# Patient Record
Sex: FEMALE | Race: WHITE | NOT HISPANIC OR LATINO | Employment: FULL TIME | URBAN - METROPOLITAN AREA
[De-identification: names, ages, dates, MRNs, and addresses within clinical notes are randomized per-mention and may not be internally consistent; named-entity substitution may affect disease eponyms.]

---

## 2017-12-21 ENCOUNTER — HOSPITAL ENCOUNTER (EMERGENCY)
Facility: HOSPITAL | Age: 62
Discharge: HOME | End: 2017-12-21
Attending: EMERGENCY MEDICINE
Payer: COMMERCIAL

## 2017-12-21 VITALS
WEIGHT: 145 LBS | HEART RATE: 67 BPM | HEIGHT: 70 IN | DIASTOLIC BLOOD PRESSURE: 78 MMHG | OXYGEN SATURATION: 99 % | SYSTOLIC BLOOD PRESSURE: 138 MMHG | RESPIRATION RATE: 16 BRPM | BODY MASS INDEX: 20.76 KG/M2

## 2017-12-21 DIAGNOSIS — T18.108A ESOPHAGEAL FOREIGN BODY: Primary | ICD-10-CM

## 2017-12-21 LAB
ANION GAP BLD CALC-SCNC: 15 MMOL/L (ref 4–13)
BUN BLD-MCNC: 14 MG/DL (ref 5–25)
CA-I BLD-SCNC: 1.25 MMOL/L (ref 1.12–1.32)
CHLORIDE BLD-SCNC: 104 MMOL/L (ref 100–108)
CREAT BLD-MCNC: 0.8 MG/DL (ref 0.6–1.3)
GFR SERPL CREATININE-BSD FRML MDRD: 80 ML/MIN/1.73SQ M
GLUCOSE SERPL-MCNC: 104 MG/DL (ref 65–140)
HCT VFR BLD CALC: 39 % (ref 34.8–46.1)
HGB BLDA-MCNC: 13.3 G/DL (ref 11.5–15.4)
PCO2 BLD: 25 MMOL/L (ref 21–32)
POTASSIUM BLD-SCNC: 3.5 MMOL/L (ref 3.5–5.3)
SODIUM BLD-SCNC: 140 MMOL/L (ref 136–145)
SPECIMEN SOURCE: ABNORMAL

## 2017-12-21 PROCEDURE — 85014 HEMATOCRIT: CPT

## 2017-12-21 PROCEDURE — 99283 EMERGENCY DEPT VISIT LOW MDM: CPT

## 2017-12-21 PROCEDURE — 96374 THER/PROPH/DIAG INJ IV PUSH: CPT

## 2017-12-21 PROCEDURE — 80047 BASIC METABLC PNL IONIZED CA: CPT

## 2017-12-21 RX ADMIN — GLUCAGON HYDROCHLORIDE 1 MG: KIT at 21:57

## 2017-12-22 NOTE — ED PROVIDER NOTES
History  Chief Complaint   Patient presents with    Foreign Body in Throat     PT to ED with apple chunk in throat, was eating fast earlier today and feels it was stuck in throat  Vomiting x 1 earlier, tried to swallow a bite of bread and had to throw up, able to swallow water  This is a 63year old female who presents with a foreign body food sensation in her throat after eating and apple at about 3:30 this afternoon  No prior similar episodes  She was able to drink a cup of coffee  Did throw up a small piece after trying to eat bread  History provided by:  Patient and spouse  Foreign Body in Throat   Location:  Swallowed  Suspected object:  Food  Pain quality:  Aching  Pain severity:  Moderate  Duration:  6 hours  Timing:  Constant  Progression:  Unchanged  Chronicity:  New  Worsened by:  Eating  Ineffective treatments:  Drinking  Associated symptoms: trouble swallowing    Associated symptoms: no abdominal pain and no vomiting    Risk factors: no prior similar events        None       History reviewed  No pertinent past medical history  Past Surgical History:   Procedure Laterality Date    VEIN LIGATION Right        History reviewed  No pertinent family history  I have reviewed and agree with the history as documented  Social History   Substance Use Topics    Smoking status: Current Every Day Smoker    Smokeless tobacco: Never Used    Alcohol use No        Review of Systems   HENT: Positive for trouble swallowing  Gastrointestinal: Negative for abdominal pain and vomiting  All other systems reviewed and are negative        Physical Exam  ED Triage Vitals [12/21/17 2114]   Temp Pulse Respirations Blood Pressure SpO2   -- 67 16 138/78 99 %      Temp src Heart Rate Source Patient Position - Orthostatic VS BP Location FiO2 (%)   -- Monitor Sitting Right arm --      Pain Score       --           Orthostatic Vital Signs  Vitals:    12/21/17 2114   BP: 138/78   Pulse: 67   Patient Position - Orthostatic VS: Sitting       Physical Exam   Constitutional: She is oriented to person, place, and time  She appears well-developed and well-nourished  No distress  HENT:   Head: Normocephalic and atraumatic  Right Ear: External ear normal    Left Ear: External ear normal    Nose: Nose normal    Mouth/Throat: Oropharynx is clear and moist    Eyes: EOM are normal  Pupils are equal, round, and reactive to light  No scleral icterus  Neck: Normal range of motion  Neck supple  No tracheal deviation present  Cardiovascular: Normal rate and intact distal pulses  Exam reveals no gallop and no friction rub  No murmur heard  Pulmonary/Chest: Effort normal and breath sounds normal  No stridor  No respiratory distress  She has no wheezes  She has no rales  Abdominal: Soft  Bowel sounds are normal  She exhibits no distension  There is no tenderness  There is no guarding  Musculoskeletal: Normal range of motion  She exhibits no edema, tenderness or deformity  Neurological: She is alert and oriented to person, place, and time  No cranial nerve deficit  Skin: Skin is warm and dry  No rash noted  She is not diaphoretic  Psychiatric: She has a normal mood and affect  Her behavior is normal  Thought content normal    Nursing note and vitals reviewed        ED Medications  Medications   glucagon (GLUCAGEN) injection 1 mg (1 mg Intravenous Given 12/21/17 2157)       Diagnostic Studies  Results Reviewed     Procedure Component Value Units Date/Time    POCT Chem 8+ [[id number]]  (Abnormal) Collected:  12/21/17 2205    Lab Status:  Final result Updated:  12/21/17 2210     SODIUM, I-STAT 140 mmol/l      Potassium, i-STAT 3 5 mmol/L      Chloride, istat 104 mmol/L      CO2, i-STAT 25 mmol/L      Anion Gap, Istat 15 (H) mmol/L      Calcium, Ionized i-STAT 1 25 mmol/L      BUN, I-STAT 14 mg/dl      Creatinine, i-STAT 0 8 mg/dl      eGFR 80 ml/min/1 73sq m      Glucose, i-STAT 104 mg/dl      Hct, i-STAT 39 %      Hgb, i-STAT 13 3 g/dl      Specimen Type VENOUS                 No orders to display              Procedures  Procedures       Phone Contacts  ED Phone Contact    ED Course  ED Course as of Dec 21 2230   u Dec 21, 2017   2227  Patient feeling better at this time was able to swallow fluids I did instruct her in her  that she should follow up with Gastroenterology to have EGD done                                [facility]  CritCare Time    Disposition  Final diagnoses:   Esophageal foreign body     Time reflects when diagnosis was documented in both MDM as applicable and the Disposition within this note     Time User Action Codes Description Comment    12/21/2017 10:29 PM Luiz Dodson Add [[id number]] Esophageal foreign body       ED Disposition     ED Disposition Condition Comment    Discharge  [facility] discharge to home/self care  Condition at discharge: Stable        Follow-up Information     Follow up With Specialties Details Why Contact Info    Geraldo Fothergill, MD Gastroenterology In 1 week for egd/scope as we discussed [address]  [phone]          Patient's Medications    No medications on file     No discharge procedures on file      ED Provider  Electronically Signed by           Tosha Del Real DO  12/21/17 2230

## 2017-12-22 NOTE — DISCHARGE INSTRUCTIONS
Esophageal Foreign Body   WHAT YOU NEED TO KNOW:   Esophageal foreign body is an object you swallowed that got stuck in your esophagus (throat)  Examples include dental work and button batteries  A piece of food or a fish bone can also become stuck in your esophagus  DISCHARGE INSTRUCTIONS:   Call 911 if:   · You have chest or abdominal pain, or shortness of breath  · You are choking  Return to the emergency department if:   · You have a fever  · You have more pain when you swallow  · You have severe vomiting  · Your vomit is bloody  · Your bowel movements are black or bloody  Contact your healthcare provider if:   · You do not find the object in your bowel movement within 2 or 3 days  · You have questions or concerns about your condition or care  Look for the object in your bowel movements:  Search for the dental work, battery, or other small, smooth object each time you have a bowel movement  Do not use laxatives or stool softeners  Do not force yourself to vomit  If you swallowed another object:   · Do not  stick your finger into your throat to try and remove an object  This could push the object even deeper  · Do  cough  You may be able to cough out the object  Follow up with your healthcare provider as directed: You may need to return for x-rays or other tests  Write down your questions so you remember to ask them during your visits  © 2017 [address] Information is for End User's use only and may not be sold, redistributed or otherwise used for commercial purposes  All illustrations and images included in CareNotes® are the copyrighted property of A D A M , Inc  or [employer]  The above information is an  only  It is not intended as medical advice for individual conditions or treatments  Talk to your doctor, nurse or pharmacist before following any medical regimen to see if it is safe and effective for you

## 2018-02-24 ENCOUNTER — HOSPITAL ENCOUNTER (EMERGENCY)
Facility: HOSPITAL | Age: 63
Discharge: HOME | End: 2018-02-25
Attending: EMERGENCY MEDICINE
Payer: OTHER MISCELLANEOUS

## 2018-02-24 VITALS
SYSTOLIC BLOOD PRESSURE: 150 MMHG | DIASTOLIC BLOOD PRESSURE: 75 MMHG | HEIGHT: 70 IN | RESPIRATION RATE: 16 BRPM | BODY MASS INDEX: 19.76 KG/M2 | TEMPERATURE: 97.8 F | WEIGHT: 138 LBS

## 2018-02-24 DIAGNOSIS — S61.011A THUMB LACERATION, RIGHT, INITIAL ENCOUNTER: Primary | ICD-10-CM

## 2018-02-24 PROCEDURE — 90715 TDAP VACCINE 7 YRS/> IM: CPT | Performed by: EMERGENCY MEDICINE

## 2018-02-24 RX ORDER — ACETAMINOPHEN 325 MG/1
975 TABLET ORAL ONCE
Status: COMPLETED | OUTPATIENT
Start: 2018-02-24 | End: 2018-02-24

## 2018-02-24 RX ADMIN — TETANUS TOXOID, REDUCED DIPHTHERIA TOXOID AND ACELLULAR PERTUSSIS VACCINE, ADSORBED 0.5 ML: 5; 2.5; 8; 8; 2.5 SUSPENSION INTRAMUSCULAR at 23:52

## 2018-02-24 RX ADMIN — ACETAMINOPHEN 975 MG: 325 TABLET, FILM COATED ORAL at 23:51

## 2018-02-25 PROCEDURE — 90471 IMMUNIZATION ADMIN: CPT

## 2018-02-25 PROCEDURE — 99283 EMERGENCY DEPT VISIT LOW MDM: CPT

## 2018-02-25 NOTE — DISCHARGE INSTRUCTIONS
Steristrips   WHAT YOU NEED TO KNOW:   Steristrips are sterile pieces of medical tape used to close wounds and help the edges grow back together  Steristrips keep the wound clean and protected while it heals  Steristrips usually fall off on their own in about 7 to 10 days  DISCHARGE INSTRUCTIONS:   Return to the emergency department if:   · You have a fever  · You see red streaks on your skin starting at the wound  · Your wound has a foul smell or is draining fluid or pus  · Your wound is red, swollen, and warm to the touch  · Your wound opens or comes apart  Contact your healthcare provider if:   · The skin under and around the steristrips itches or is not comfortable  · You have questions or concerns about your condition or care  How to use steristrips:  Always wash your hands before you care for your wound  This will help prevent infection  Clean and dry the skin around your wound before you put on new steristrips  · Care for the wound as directed  Do not bathe for 24 hours  After 24 hours, wash around the area gently as directed  Pat the area dry with a clean towel, or let it air dry  Do not rub the area with a towel to dry it  Check the wound for signs of infection, such as swelling or pus  · Only remove the steristrips if directed  Your healthcare provider may want you to remove the steristrips after a certain number of days  Do not remove them early, even if they are causing itching or discomfort  If you are directed to remove the steristrips, pull gently  You might cause the wound to open if you pull hard  Hold the skin down as you slowly and gently remove the strips  · Apply new steristrips as directed  Start at the middle of the wound  Gently bring the edges of the wound together and place the middle of the steristrip on the wound  Do not stretch the steristrip  Smooth the ends of the steristrip down onto your skin   Add more steristrips as needed for the rest of the wound  Leave small gaps between strips so you do not completely cover the wound  Fluid from the wound may build under the strips if you completely cover it  The fluid may make the strips peel  Your healthcare provider may recommend that you add steristrips down the ends of the pieces you placed across the wound  This will help keep the steristrips in place as you move  · Do not scrub or pick at the steristrips  This can cause your wound to open  Trim the edges of the strips if they start to curl  Then press the ends firmly onto your skin  Follow up with your healthcare provider as directed:  Write down your questions so you remember to ask them during your visits  © 2017 [address]  Information is for End User's use only and may not be sold, redistributed or otherwise used for commercial purposes  All illustrations and images included in CareNotes® are the copyrighted property of [employer]  or [employer]  The above information is an  only  It is not intended as medical advice for individual conditions or treatments  Talk to your doctor, nurse or pharmacist before following any medical regimen to see if it is safe and effective for you

## 2018-02-25 NOTE — ED PROVIDER NOTES
H&P Exam - Trauma   Marisa Hendricks 63 y o  female MRN: [id number]  Unit/Bed#: ED 07/ED 07 Encounter: [id number]    Assessment/Plan   Trauma Alert:    Model of Arrival:   via    Trauma Team: Current Providers  Attending Provider: Miguel Triplett DO  Registered Nurse: Malgorzata Em RN  Consultants: {[facility] Trauma Consultants:[id number]}    Trauma Active Problems: ***    Trauma Plan: ***    Chief Complaint:   Chief Complaint   Patient presents with    Hand Laceration     Pt to ED with R hand laceration at knuckle joint digit 1  History of Present Illness   HPI:  Marisa Hendricks is a 63 y o  female who presents with ***  Mechanism:           Patient complains of trip and fall on patio hit face on patio, complains right hand and knee pain  Helped up by   Review of Systems    Historical Information     Immunizations: There is no immunization history on file for this patient  History reviewed  No pertinent past medical history  History reviewed  No pertinent family history    Past Surgical History:   Procedure Laterality Date    VEIN LIGATION Right        Social History     Social History    Marital status: Single     Spouse name: N/A    Number of children: N/A    Years of education: N/A     Social History Main Topics    Smoking status: Current Every Day Smoker    Smokeless tobacco: Never Used    Alcohol use No    Drug use: No    Sexual activity: Not Asked     Other Topics Concern    None     Social History Narrative    None       Family History: {[facility] Trauma Family History:40853::"non-contributory"}    Meds/Allergies   None       No Known Allergies    PHYSICAL EXAM    PE limited by: ***    Objective   Vitals:   First set: Temperature: 97 8 °F (36 6 °C) (02/24/18 2241)  Respirations: 16 (02/24/18 2241)  Blood Pressure: 150/75 (02/24/18 2241)    Primary Survey:   (A) Airway: ***  (B) Breathing: ***  (C) Circulation: Pulses:   {Pulses:35084}  (D) Disabliity:  {GCS response:04345}  (E) Expose:  {Completed:10774}    Secondary Survey: (Click on Physical Exam tab above)  Physical Exam    Invasive Devices          No matching active lines, drains, or airways          Lab Results:   Results Reviewed     None                 Imaging Studies:   No orders to display       Other Studies: ***    Code Status: No Order  Advance Directive and Living Will:      Power of :    POLST:      Procedures  Procedures       Phone Contacts  ED Phone Contact     ED Course  ED Course          MDM  CritCare Time    Disposition  Final diagnoses:   None     ED Disposition     None      Follow-up Information    None       Patient's Medications    No medications on file     No discharge procedures on file        ED Provider  Electronically Signed by

## 2018-02-25 NOTE — ED PROVIDER NOTES
History  Chief Complaint   Patient presents with    Hand Laceration     Pt to ED with R hand laceration at knuckle joint digit 1  Patient cut right thumb with  at work around 9 hours ago  Unknown last tetanus vaccine  Pain with movement and bleeding hasn't stopped after applying butterfly strips  None       History reviewed  No pertinent past medical history  Past Surgical History:   Procedure Laterality Date    VEIN LIGATION Right        History reviewed  No pertinent family history  I have reviewed and agree with the history as documented  Social History   Substance Use Topics    Smoking status: Current Every Day Smoker    Smokeless tobacco: Never Used    Alcohol use No        Review of Systems   All other systems reviewed and are negative  Physical Exam  ED Triage Vitals [02/24/18 2241]   Temperature Pulse Respirations Blood Pressure SpO2   97 8 °F (36 6 °C) -- 16 150/75 --      Temp Source Heart Rate Source Patient Position - Orthostatic VS BP Location FiO2 (%)   Temporal Monitor Sitting Left arm --      Pain Score       --           Orthostatic Vital Signs  Vitals:    02/24/18 2241   BP: 150/75   Patient Position - Orthostatic VS: Sitting       Physical Exam   Constitutional: She is oriented to person, place, and time  She appears well-developed and well-nourished  HENT:   Mouth/Throat: Oropharynx is clear and moist    Eyes: Conjunctivae and EOM are normal  Pupils are equal, round, and reactive to light  Neck: Normal range of motion  Neck supple  No spinous process tenderness present  Cardiovascular: Normal rate, regular rhythm, normal heart sounds and intact distal pulses  Pulmonary/Chest: Effort normal and breath sounds normal  No respiratory distress  She has no wheezes  Abdominal: Soft  Bowel sounds are normal  She exhibits no distension  There is no tenderness  Musculoskeletal: Normal range of motion          Right hand: She exhibits tenderness and laceration  She exhibits normal range of motion  Normal sensation noted  Normal strength noted  Hands:  5 cm laceration dorsal right thumb   Neurological: She is alert and oriented to person, place, and time  She has normal strength  No sensory deficit  GCS eye subscore is 4  GCS verbal subscore is 5  GCS motor subscore is 6  Skin: Skin is warm and dry  No rash noted  Psychiatric: She has a normal mood and affect  Nursing note and vitals reviewed  ED Medications  Medications   acetaminophen (TYLENOL) tablet 975 mg (975 mg Oral Given 2/24/18 2168)   tetanus-diphtheria-acellular pertussis (BOOSTRIX) IM injection 0 5 mL (0 5 mL Intramuscular Given 2/24/18 8569)       Diagnostic Studies  Results Reviewed     None                 No orders to display              Procedures  Lac Repair  Date/Time: 2/24/2018 10:35 PM  Performed by: Eddie Maxwell by: Vandana Castro   Consent: Verbal consent obtained  Risks and benefits: risks, benefits and alternatives were discussed  Consent given by: patient  Patient understanding: patient states understanding of the procedure being performed  Patient consent: the patient's understanding of the procedure matches consent given  Patient identity confirmed: verbally with patient  Body area: upper extremity  Location details: right thumb  Laceration length: 5 cm  Contamination: The wound is contaminated  Foreign bodies: no foreign bodies  Tendon involvement: none  Nerve involvement: none      Procedure Details:  Irrigation solution: tap water  Irrigation method: tap  Amount of cleaning: extensive  Debridement: none  Patient tolerance: Patient tolerated the procedure well with no immediate complications  Comments: Steri strips were applied as patient was outside 6 hour window to close with stitches               Phone Contacts  ED Phone Contact    ED Course  ED Course                                MDM  Number of Diagnoses or Management Options  Thumb laceration, right, initial encounter: new and does not require workup  Patient Progress  Patient progress: improved    CritCare Time    Disposition  Final diagnoses:   Thumb laceration, right, initial encounter     Time reflects when diagnosis was documented in both MDM as applicable and the Disposition within this note     Time User Action Codes Description Comment    2/25/2018 12:12 AM Leatha Pugh Add [[id number]] Thumb laceration, right, initial encounter       ED Disposition     ED Disposition Condition Comment    Discharge  [facility] discharge to home/self care  Condition at discharge: Good        Follow-up Information     Follow up With Specialties Details Why Contact Info Additional Information    [facility] In 5 days  [address] [address] [address], [address], [facility]        There are no discharge medications for this patient  No discharge procedures on file      ED Provider  Electronically Signed by           Antolin Ramos DO  02/25/18 4698

## 2018-03-01 ENCOUNTER — APPOINTMENT (OUTPATIENT)
Dept: URGENT CARE | Facility: CLINIC | Age: 63
End: 2018-03-01
Payer: OTHER MISCELLANEOUS

## 2018-03-01 PROCEDURE — 99283 EMERGENCY DEPT VISIT LOW MDM: CPT

## 2018-03-01 PROCEDURE — G0382 LEV 3 HOSP TYPE B ED VISIT: HCPCS

## 2018-03-06 ENCOUNTER — APPOINTMENT (OUTPATIENT)
Dept: URGENT CARE | Facility: CLINIC | Age: 63
End: 2018-03-06
Payer: OTHER MISCELLANEOUS

## 2018-03-06 PROCEDURE — 99213 OFFICE O/P EST LOW 20 MIN: CPT | Performed by: EMERGENCY MEDICINE

## 2020-06-18 ENCOUNTER — OFFICE VISIT (OUTPATIENT)
Dept: URGENT CARE | Facility: CLINIC | Age: 65
End: 2020-06-18
Payer: COMMERCIAL

## 2020-06-18 VITALS
HEIGHT: 70 IN | SYSTOLIC BLOOD PRESSURE: 126 MMHG | DIASTOLIC BLOOD PRESSURE: 70 MMHG | HEART RATE: 76 BPM | TEMPERATURE: 97.8 F | WEIGHT: 150 LBS | BODY MASS INDEX: 21.47 KG/M2 | RESPIRATION RATE: 16 BRPM

## 2020-06-18 DIAGNOSIS — R11.0 NAUSEA: Primary | ICD-10-CM

## 2020-06-18 PROCEDURE — G0382 LEV 3 HOSP TYPE B ED VISIT: HCPCS | Performed by: PHYSICIAN ASSISTANT

## 2020-06-18 RX ORDER — ONDANSETRON 4 MG/1
4 TABLET, ORALLY DISINTEGRATING ORAL EVERY 6 HOURS PRN
Qty: 20 TABLET | Refills: 0 | Status: SHIPPED | OUTPATIENT
Start: 2020-06-18

## 2020-06-18 RX ORDER — MECLIZINE HCL 12.5 MG/1
12.5 TABLET ORAL EVERY 8 HOURS PRN
Qty: 20 TABLET | Refills: 0 | Status: SHIPPED | OUTPATIENT
Start: 2020-06-18 | End: 2020-06-25

## 2020-06-18 RX ORDER — MECLIZINE HCL 12.5 MG/1
12.5 TABLET ORAL ONCE
Status: COMPLETED | OUTPATIENT
Start: 2020-06-18 | End: 2020-06-18

## 2020-06-18 RX ORDER — ONDANSETRON 4 MG/1
4 TABLET, ORALLY DISINTEGRATING ORAL ONCE
Status: COMPLETED | OUTPATIENT
Start: 2020-06-18 | End: 2020-06-18

## 2020-06-18 RX ADMIN — MECLIZINE HCL 12.5 MG: 12.5 TABLET ORAL at 17:59

## 2020-06-18 RX ADMIN — ONDANSETRON 4 MG: 4 TABLET, ORALLY DISINTEGRATING ORAL at 17:16

## 2020-06-22 ENCOUNTER — OFFICE VISIT (OUTPATIENT)
Dept: FAMILY MEDICINE CLINIC | Facility: CLINIC | Age: 65
End: 2020-06-22
Payer: COMMERCIAL

## 2020-06-22 VITALS
HEART RATE: 80 BPM | HEIGHT: 69 IN | BODY MASS INDEX: 22.39 KG/M2 | WEIGHT: 151.2 LBS | SYSTOLIC BLOOD PRESSURE: 100 MMHG | DIASTOLIC BLOOD PRESSURE: 76 MMHG | RESPIRATION RATE: 15 BRPM | TEMPERATURE: 98.1 F

## 2020-06-22 DIAGNOSIS — R42 VERTIGO: Primary | ICD-10-CM

## 2020-06-22 PROCEDURE — 99203 OFFICE O/P NEW LOW 30 MIN: CPT | Performed by: FAMILY MEDICINE

## 2020-06-22 PROCEDURE — 4004F PT TOBACCO SCREEN RCVD TLK: CPT | Performed by: FAMILY MEDICINE

## 2020-06-22 PROCEDURE — 3008F BODY MASS INDEX DOCD: CPT | Performed by: FAMILY MEDICINE

## 2022-08-23 ENCOUNTER — OFFICE VISIT (OUTPATIENT)
Dept: FAMILY MEDICINE CLINIC | Facility: CLINIC | Age: 67
End: 2022-08-23
Payer: COMMERCIAL

## 2022-08-23 VITALS
RESPIRATION RATE: 18 BRPM | HEIGHT: 69 IN | HEART RATE: 76 BPM | SYSTOLIC BLOOD PRESSURE: 100 MMHG | DIASTOLIC BLOOD PRESSURE: 72 MMHG | WEIGHT: 150 LBS | OXYGEN SATURATION: 98 % | BODY MASS INDEX: 22.22 KG/M2

## 2022-08-23 DIAGNOSIS — R42 VERTIGO: ICD-10-CM

## 2022-08-23 DIAGNOSIS — Z78.0 MENOPAUSE: Primary | ICD-10-CM

## 2022-08-23 DIAGNOSIS — R11.0 NAUSEA: ICD-10-CM

## 2022-08-23 DIAGNOSIS — Z12.31 ENCOUNTER FOR SCREENING MAMMOGRAM FOR MALIGNANT NEOPLASM OF BREAST: ICD-10-CM

## 2022-08-23 PROCEDURE — 3725F SCREEN DEPRESSION PERFORMED: CPT | Performed by: FAMILY MEDICINE

## 2022-08-23 PROCEDURE — 99214 OFFICE O/P EST MOD 30 MIN: CPT | Performed by: FAMILY MEDICINE

## 2022-08-23 RX ORDER — MECLIZINE HCL 12.5 MG/1
12.5 TABLET ORAL EVERY 8 HOURS PRN
Qty: 20 TABLET | Refills: 0 | Status: SHIPPED | OUTPATIENT
Start: 2022-08-23 | End: 2022-08-30

## 2022-08-23 NOTE — PROGRESS NOTES
Assessment/Plan:    Vertigo  We will renew her meclizine  We will also send her to physical therapy for treatment          Depression Screening and Follow-up Plan: Patient was screened for depression during today's encounter  They screened negative with a PHQ-2 score of 0  Tobacco Cessation Counseling: Tobacco cessation counseling was provided  The patient is sincerely urged to quit consumption of tobacco  She is not ready to quit tobacco            Subjective:   Estrellita Wells is a 61 y  o female  Chief Complaint   Patient presents with    vertigo     Today is Tuesday, on Saturday patient will cup out of a deep sleep in the room was spinning  She felt nauseated and had a keep still  She could not put her head back down because it made her spin worse  Sunday she went to work and was okay however Sunday night she needed to vomit again    Today she feels off    This last happened and she was here June 2020      Past medical history, social history, and family history reviewed as appropriate for the complaint of this patient  MEDICATIONS REVIEWED AND UPDATED    10 point review of systems performed, the remainder of the ROS is negative except for what is noted in the history of chief complaint    Objective:    Vitals:    08/23/22 1018   BP: 100/72   Pulse: 76   Resp: 18   SpO2: 98%     Body mass index is 22 15 kg/m²      Physical Exam    Constitutional  Appears healthy, Looks well, Appearance consistent with age    Mental Status  Alert, Oriented, Cooperative, Memory function normal , clean, and reasonable    HEENT  TMs both are clear    Neck  No neck mass, No thyromegaly, Good carotid upstrokes bilaterally, trachea midline positive click    Respiratory  Breath sounds normal, No rales, No rhonchi, No wheezing, normal palpation    Cardiac   Regular rhythm without ectopy or murmur no S3-S4, no heave lift or thrill to palpation    Vascular  No leg edema, No pedal edema    Muscular skeletal  No clubbing cyanosis , muscle tone normal    Skin  No appreciable rashes or abnormal appearing lesions

## 2022-08-23 NOTE — PATIENT INSTRUCTIONS
1  Please take 1 meclizine up to 4 times a day remembering that could make you tired but will stop the spinning    2   Make an appointment with physical therapy as soon as you can to start treatment

## 2022-08-25 ENCOUNTER — EVALUATION (OUTPATIENT)
Dept: PHYSICAL THERAPY | Facility: CLINIC | Age: 67
End: 2022-08-25
Payer: COMMERCIAL

## 2022-08-25 DIAGNOSIS — R42 VERTIGO: ICD-10-CM

## 2022-08-25 PROCEDURE — 97162 PT EVAL MOD COMPLEX 30 MIN: CPT

## 2022-08-25 NOTE — PROGRESS NOTES
PT Evaluation     Today's date: 2022  Patient name: Sravani Julio  : 1955  MRN: [id number]  Referring provider: Bebe Roberts DO  Dx:   Encounter Diagnosis     ICD-10-CM    1  Vertigo  R42 Ambulatory referral to Physical Therapy       Start Time: 0800  Stop Time: 0845  Total time in clinic (min): 45 minutes    Assessment  Assessment details: Patient is a 61year old female who presents with s/s consistent with posterior canal BPPV, triggered episodic vertigo syndrome  Based on presentation the patient has a positive Sofia-Hallpike test which indicates right posterior canal canalithiasis  Patient education provided using video and discussion  Patient responded well to a particle repositioning technique for right posterior canal canalithiasis/cupulolithiasis after 2 trials  At end of session patient noted with resolution of symptoms  Patient will call clinic with questions or concerns  Impairments: abnormal gait, activity intolerance, impaired balance, impaired physical strength, lacks appropriate home exercise program and safety issue  Barriers to therapy: Work hours, cruise in 2 weeks     Prognosis: good    Goals  In 1-2 weeks, patient will be:  1  Independent in basic HEP for balance  2  Low fall risk on balance assessment    In 3-4 weeks, patient will be:  3  Independent with habituation exercises and self-correction techniques as able    4   Free from s/s of BPPV posterior canals canalithiasis bilaterally      Plan  Patient would benefit from: skilled physical therapy  Planned therapy interventions: neuromuscular re-education, manual therapy, patient education, home exercise program, therapeutic exercise, therapeutic activities and gait training  Frequency: 2x week  Duration in weeks: 8  Plan of Care beginning date: 2022  Plan of Care expiration date: 10/24/2022  Treatment plan discussed with: patient        Subjective Evaluation    History of Present Illness  Mechanism of injury: Present at PT: Laying down turning her head, bending over, taking a shower  Woke up in the middle of the night Saturday spinning  Dizziness lasted all day, nausea all day  Sunday was kind of okay, went to work  Went home from work and vomited  Feels spinning looking up/down, bending over  Works as manager moving cases and bottles, she has been avoiding this  When she walks she feels like she is on a ship  Had a past episode 2 years ago  Just went away on its own didn't last long  Had been giving medicine for her dizziness, it has been "ok" feels a little off-kilter  No double vision, difficulty swallowing/speaking, no change in hearing, does note ringing in ears on one day but didn't last long  No feelings off fainting or passing out  Notes she is healthy otherwise  Social Support  Stairs in house: yes   Lives in: multiple-level home  Lives with: alone    Employment status: working    Diagnostic Tests  No diagnostic tests performed  Treatments  No previous or current treatments  Patient Goals  Patient goals for therapy: improved balance, independence with ADLs/IADLs and return to work  Patient goal: go on my cruise without dizziness        Objective     Concurrent Complaints  Positive for nausea/motion sickness  Negative for headaches, visual change and hearing loss  Neuro Exam:     Dizziness  Positive for disequilibrium, vertigo and rocking or swaying  Negative for motion sickness and diplopia  Exacerbating factors  Positive for bending over, rolling in bed, looking up, turning head and supine to/from sitting  Headaches   Patient reports headaches: No      Cervical exam   Ligament Laxity Testing   Alar ligament: WNL  Sharp Daniel:  WNL  Modified VBI   Left: asymptomatic  Right: asymptomatic    Oculomotor exam   Oculomotor ROM: WNL  Resting nystagmus: not present   Gaze holding nystagmus: not present left  and not present right  Smooth pursuits: within normal limits  Convergence: normal  Cover test: normal    Positional testing   [place]-Hallpike   Left posterior canal: symptomatic and WNL  Right posterior canal: symptomatic, torsional and upbeating    Sensation   Light touch LE: left WNL and right WNL    Coordination   Finger to nose: left WNL and right WNL  Rapid alternating movements: UE WNL and LE impaired              Precautions: 09/23/2022  Re-eval Date: None    Date 08/25       Visit Count 1       FOTO See IE       Pain In See IE       Pain Out                Testing        Dixhallpike/sidelying (+) R sharda-hallpike       Roll test        DVA        FGA 22               Neuro Re-Ed        Mod epley x2 R       BBQ        Adaptation        Habituation                                Ther Ex                                                                        Ther Activity        ADL                Gait Training        Divided Attention        Head turns        Modalities         prn

## 2022-08-30 ENCOUNTER — OFFICE VISIT (OUTPATIENT)
Dept: PHYSICAL THERAPY | Facility: CLINIC | Age: 67
End: 2022-08-30
Payer: COMMERCIAL

## 2022-08-30 DIAGNOSIS — R42 VERTIGO: Primary | ICD-10-CM

## 2022-08-30 PROCEDURE — 97112 NEUROMUSCULAR REEDUCATION: CPT

## 2022-08-30 NOTE — PROGRESS NOTES
Daily Note     Today's date: 2022  Patient name: Willian Masters  : 1955  MRN: [id number]  Referring provider: Radha Christie DO  Dx:   Encounter Diagnosis     ICD-10-CM    1  Vertigo  R42        Start Time: 801  Stop Time: 458  Total time in clinic (min): 23 minutes    Subjective: Feels like shes 99 9% better  No episodes like she was having before  Still some nausea with bending over  Going on cruise 9/10  Objective: See treatment diary below  WNL [place]-Hallpike BL x3    Assessment: Pt with WNL sharda-hallpike, some feelings of "waviness" with sharda-hallpike to both sides, however, not consistent  Tolerates x1 mod epley maneuver well with min dizziness throughout positions  Intorduced olmedo-daroff exercises for habituation with good response, min dizziness reported  Discussed HEP and how to program into her day  Follow up next week prior to cruise, instructed to reach out before as needed  Patient would benefit from continued PT to ensure dizziness is abolished  Plan: Continue per plan of care  Sharda-hallpike testing       Precautions: 2022  Re-eval Date: None    Date        Visit Count 1       FOTO See IE       Pain In See IE       Pain Out                Testing        Dixhallpike/sidelying (+) R sharda-hallpike       Roll test        DVA        FGA 22               Neuro Re-Ed        Mod epley x2 R       BBQ        Adaptation        Habituation                                Ther Ex                                                                        Ther Activity        ADL                Gait Training        Divided Attention        Head turns        Modalities         prn

## 2022-09-06 ENCOUNTER — OFFICE VISIT (OUTPATIENT)
Dept: PHYSICAL THERAPY | Facility: CLINIC | Age: 67
End: 2022-09-06
Payer: COMMERCIAL

## 2022-09-06 DIAGNOSIS — R42 VERTIGO: Primary | ICD-10-CM

## 2022-09-06 PROCEDURE — 97112 NEUROMUSCULAR REEDUCATION: CPT

## 2022-09-06 NOTE — PROGRESS NOTES
Daily Note / Discharge note    Today's date: 2022  Patient name: Sasha Duffy  : 1955  MRN: [id number]  Referring provider: Leopoldo Blaze, DO  Dx:   Encounter Diagnosis     ICD-10-CM    1  Vertigo  R42        Start Time: 801  Stop Time: 260  Total time in clinic (min): 9 minutes    Subjective: Pt reports no dizzy spells  Feels comfortable being done with therapy  Going on cruise at end of week  Objective: See treatment diary below  WNL [place]-Hallpike BL x1    Assessment: Pt with (-) BL sharda-hallpike  No dizzy spells over the past week  Pt has achieved goals for PT  Pt and PT mutually agree to discharge from skilled PT  Patient would benefit from continued PT to ensure dizziness is abolished  Plan: Pt discharged from skilled PT       Precautions: 2022  Re-eval Date: None    Date      Visit Count 1  3     FOTO See IE       Pain In See IE       Pain Out                Testing        Dixhallpike/sidelying (+) R sharda-hallpike  (-) BL sharda hallpike     Roll test        DVA        FGA 22               Neuro Re-Ed        Mod epley x2 R       BBQ        Adaptation        Habituation                                Ther Ex                                                                        Ther Activity        ADL                Gait Training        Divided Attention        Head turns        Modalities         prn

## 2023-05-26 ENCOUNTER — APPOINTMENT (OUTPATIENT)
Dept: RADIOLOGY | Facility: HOSPITAL | Age: 68
End: 2023-05-26

## 2023-05-26 ENCOUNTER — HOSPITAL ENCOUNTER (EMERGENCY)
Facility: HOSPITAL | Age: 68
Discharge: HOME | End: 2023-05-26
Attending: EMERGENCY MEDICINE

## 2023-05-26 VITALS
OXYGEN SATURATION: 99 % | TEMPERATURE: 98 F | DIASTOLIC BLOOD PRESSURE: 74 MMHG | HEART RATE: 61 BPM | SYSTOLIC BLOOD PRESSURE: 115 MMHG | RESPIRATION RATE: 18 BRPM

## 2023-05-26 DIAGNOSIS — S63.502A WRIST SPRAIN, LEFT, INITIAL ENCOUNTER: ICD-10-CM

## 2023-05-26 DIAGNOSIS — S52.123A RADIAL HEAD FRACTURE: Primary | ICD-10-CM

## 2023-05-27 NOTE — ED PROVIDER NOTES
"History  Chief Complaint   Patient presents with   • Fall     Patient states\"she tripped over a rope and fell and landed on the grass\"  Patient c/o left elbow injury along with left wrist injury  Patient denies head strike  No blood thinners  No aspirin  No loc  This is a 80-year-old female states she tripped over a hose yesterday and injured her left elbow and wrist denies any head injury no loss of consciousness      History provided by:  Patient  Medical Problem  Location:  Left elbow and wrist  Quality:  Achy pain  Severity:  Moderate  Onset quality:  Sudden  Duration:  1 day  Timing:  Constant  Progression:  Unchanged  Chronicity:  New  Context:  Trip and fall with left arm pain  Worsened by: Movement      Prior to Admission Medications   Prescriptions Last Dose Informant Patient Reported? Taking?   meclizine (ANTIVERT) 12 5 MG tablet   No No   Sig: Take 1 tablet (12 5 mg total) by mouth every 8 (eight) hours as needed for dizziness for up to 7 days      Facility-Administered Medications: None       History reviewed  No pertinent past medical history  Past Surgical History:   Procedure Laterality Date   • LEG SURGERY     • VEIN LIGATION Right        Family History   Problem Relation Age of Onset   • Hypertension Mother    • Kidney failure Mother    • Hypertension Father    • Alcohol abuse Neg Hx    • Substance Abuse Neg Hx    • Mental illness Neg Hx      I have reviewed and agree with the history as documented  E-Cigarette/Vaping   • E-Cigarette Use Never User      E-Cigarette/Vaping Substances     Social History     Tobacco Use   • Smoking status: Every Day     Packs/day: 0 25     Types: Cigarettes   • Smokeless tobacco: Never   Vaping Use   • Vaping Use: Never used   Substance Use Topics   • Alcohol use: No   • Drug use: No       Review of Systems   Musculoskeletal:        Left elbow and wrist pain   All other systems reviewed and are negative        Physical Exam  Physical Exam  Vitals and " nursing note reviewed  Constitutional:       General: She is not in acute distress  Appearance: She is not ill-appearing, toxic-appearing or diaphoretic  HENT:      Head: Normocephalic and atraumatic  Right Ear: Tympanic membrane, ear canal and external ear normal       Left Ear: Tympanic membrane, ear canal and external ear normal    Eyes:      General:         Right eye: No discharge  Left eye: No discharge  Extraocular Movements: Extraocular movements intact  Pupils: Pupils are equal, round, and reactive to light  Cardiovascular:      Rate and Rhythm: Normal rate and regular rhythm  Pulses: Normal pulses  Heart sounds: No murmur heard  No friction rub  No gallop  Pulmonary:      Effort: Pulmonary effort is normal  No respiratory distress  Breath sounds: No stridor  No wheezing, rhonchi or rales  Abdominal:      General: There is no distension  Palpations: Abdomen is soft  Tenderness: There is no abdominal tenderness  There is no guarding or rebound  Musculoskeletal:         General: Swelling, tenderness and signs of injury present  No deformity  Cervical back: Neck supple  No rigidity or tenderness  Right lower leg: No edema  Left lower leg: No edema  Comments: Tenderness and swelling over the dorsal aspect of the left wrist and posterior left elbow with decreased range of motion secondary to pain pulses and gross sensation are intact   Skin:     General: Skin is warm and dry  Coloration: Skin is not jaundiced  Findings: No erythema or rash  Neurological:      General: No focal deficit present  Mental Status: She is alert and oriented to person, place, and time  Cranial Nerves: No cranial nerve deficit  Sensory: No sensory deficit  Coordination: Coordination normal    Psychiatric:         Mood and Affect: Mood normal          Behavior: Behavior normal          Thought Content:  Thought content normal          Vital Signs  ED Triage Vitals [05/26/23 1847]   Temperature Pulse Respirations Blood Pressure SpO2   98 °F (36 7 °C) 61 18 115/74 99 %      Temp src Heart Rate Source Patient Position - Orthostatic VS BP Location FiO2 (%)   -- -- -- -- --      Pain Score       --           Vitals:    05/26/23 1847   BP: 115/74   Pulse: 61         Visual Acuity  Visual Acuity    Flowsheet Row Most Recent Value   L Pupil Size (mm) 3   R Pupil Size (mm) 3          ED Medications  Medications - No data to display    Diagnostic Studies  Results Reviewed     None                 XR wrist 3+ views LEFT   ED Interpretation by Felicita Bailey DO (05/26 2054)   Dorsal bony avulsion fracture age-indeterminate no old x-rays for comparison      XR elbow 3+ vw LEFT   ED Interpretation by Felicita Bailey DO (05/26 2054)   Anterior and posterior fat pad most consistent with radial head fracture                 Procedures  Procedures         ED Course                               SBIRT 22yo+    Flowsheet Row Most Recent Value   Initial Alcohol Screen: US AUDIT-C     1  How often do you have a drink containing alcohol? 0 Filed at: 05/26/2023 1854   2  How many drinks containing alcohol do you have on a typical day you are drinking? 0 Filed at: 05/26/2023 1854   3a  Male UNDER 65: How often do you have five or more drinks on one occasion? 0 Filed at: 05/26/2023 1854   3b  FEMALE Any Age, or MALE 65+: How often do you have 4 or more drinks on one occassion? 0 Filed at: 05/26/2023 1854   Audit-C Score 0 Filed at: 05/26/2023 1854   DENISE: How many times in the past year have you    Used an illegal drug or used a prescription medication for non-medical reasons?  Never Filed at: 05/26/2023 1854                    Medical Decision Making  Trip and fall with left arm wrist and elbow injury we will check x-rays rule out fracture dislocation    Amount and/or Complexity of Data Reviewed  Radiology: ordered and independent interpretation performed  Disposition  Final diagnoses:   Radial head fracture - Left side   Wrist sprain, left, initial encounter     Time reflects when diagnosis was documented in both MDM as applicable and the Disposition within this note     Time User Action Codes Description Comment    5/26/2023  9:01 PM Linda Montero [L22 202I] Radial head fracture     5/26/2023  9:01 PM Jay Fallon 192 Radial head fracture Left side    5/26/2023  9:01 PM Linda Montero [L94 362D] Wrist sprain, left, initial encounter       ED Disposition     ED Disposition   Discharge    Condition   Stable    Date/Time   Fri May 26, 2023  9:01 PM    Comment   Sharon Wolff discharge to home/self care  Follow-up Information     Follow up With Specialties Details Why Contact Info Additional Information     [facility] In 1 week  [address] [address] [phone] [address] [facility], [address] [place] [address], [place], [place], [address]          Discharge Medication List as of 5/26/2023  9:02 PM      CONTINUE these medications which have NOT CHANGED    Details   meclizine (ANTIVERT) 12 5 MG tablet Take 1 tablet (12 5 mg total) by mouth every 8 (eight) hours as needed for dizziness for up to 7 days, Starting Tue 8/23/2022, Until Tue 8/30/2022 at 2359, Normal             No discharge procedures on file  PDMP Review     None        Patient declines splinting of the left arm states that she just wants a wrap and a sling will follow-up with orthopedics    ED Provider  Electronically Signed by           Luiza Ovalle DO  05/26/23 5632

## 2023-05-30 ENCOUNTER — TELEPHONE (OUTPATIENT)
Dept: OBGYN CLINIC | Facility: CLINIC | Age: 68
End: 2023-05-30

## 2023-05-30 ENCOUNTER — TELEPHONE (OUTPATIENT)
Dept: OBGYN CLINIC | Facility: HOSPITAL | Age: 68
End: 2023-05-30

## 2023-05-30 NOTE — TELEPHONE ENCOUNTER
Caller: Patient    Doctor: n/a    Reason for call: calling for elbow fx, placed on brief hold and call dropped     Call back#: n/a

## 2023-05-30 NOTE — TELEPHONE ENCOUNTER
Caller: Patient    Doctor: n/a    Reason for call: Pt has redial head fx and wants to be seen in [facility] today  Please advise if this can be forced to the schedule      Call back#: [phone]

## 2023-05-30 NOTE — TELEPHONE ENCOUNTER
Hello,  Please advise if the following patient can be forced onto the schedule:    Patient: Sharon Wolff     : 59     MRN: [id number]    Call back #: [phone]     Insurance: W/C    Reason for appointment:Acute nondisplaced radial neck fracture    Requested doctor/location: [place]       Thank you      Email was sent to management